# Patient Record
Sex: FEMALE
[De-identification: names, ages, dates, MRNs, and addresses within clinical notes are randomized per-mention and may not be internally consistent; named-entity substitution may affect disease eponyms.]

---

## 2018-02-14 ENCOUNTER — HOSPITAL ENCOUNTER (EMERGENCY)
Dept: HOSPITAL 14 - H.ER | Age: 73
LOS: 1 days | Discharge: HOME | End: 2018-02-15
Payer: MEDICARE

## 2018-02-14 VITALS — OXYGEN SATURATION: 97 %

## 2018-02-14 DIAGNOSIS — R07.9: Primary | ICD-10-CM

## 2018-02-14 DIAGNOSIS — R10.13: ICD-10-CM

## 2018-02-14 LAB
ALBUMIN SERPL-MCNC: 4.1 G/DL (ref 3.5–5)
ALBUMIN/GLOB SERPL: 1 {RATIO} (ref 1–2.1)
ALT SERPL-CCNC: 26 U/L (ref 9–52)
AST SERPL-CCNC: 22 U/L (ref 14–36)
BASOPHILS # BLD AUTO: 0 K/UL (ref 0–0.2)
BASOPHILS NFR BLD: 0.5 % (ref 0–2)
BUN SERPL-MCNC: 23 MG/DL (ref 7–17)
CALCIUM SERPL-MCNC: 11.2 MG/DL (ref 8.4–10.2)
EOSINOPHIL # BLD AUTO: 0.1 K/UL (ref 0–0.7)
EOSINOPHIL NFR BLD: 1.1 % (ref 0–4)
ERYTHROCYTE [DISTWIDTH] IN BLOOD BY AUTOMATED COUNT: 15.6 % (ref 11.5–14.5)
GFR NON-AFRICAN AMERICAN: > 60
HGB BLD-MCNC: 12.8 G/DL (ref 12–16)
LYMPHOCYTES # BLD AUTO: 2.5 K/UL (ref 1–4.3)
LYMPHOCYTES NFR BLD AUTO: 33.4 % (ref 20–40)
MCH RBC QN AUTO: 28.8 PG (ref 27–31)
MCHC RBC AUTO-ENTMCNC: 31.9 G/DL (ref 33–37)
MCV RBC AUTO: 90.3 FL (ref 81–99)
MONOCYTES # BLD: 0.4 K/UL (ref 0–0.8)
MONOCYTES NFR BLD: 5.7 % (ref 0–10)
NEUTROPHILS # BLD: 4.4 K/UL (ref 1.8–7)
NEUTROPHILS NFR BLD AUTO: 59.3 % (ref 50–75)
NRBC BLD AUTO-RTO: 0.3 % (ref 0–0)
PLATELET # BLD: 328 K/UL (ref 130–400)
PMV BLD AUTO: 7.7 FL (ref 7.2–11.7)
RBC # BLD AUTO: 4.43 MIL/UL (ref 3.8–5.2)
WBC # BLD AUTO: 7.4 K/UL (ref 4.8–10.8)

## 2018-02-14 NOTE — ED PDOC
HPI: Chest Pain


Time Seen by Provider: 02/14/18 20:00


Chief Complaint (Nursing): Chest Pain


Chief Complaint (Provider): whole body and chest pain


History Per: Patient


History/Exam Limitations: no limitations


Onset/Duration Of Symptoms: Days (2 days ago)


Current Symptoms Are (Timing): Still Present


Additional Complaint(s): 





72 y.o female, brought in by EMS,  with a history of Diabetes and Hypertension, 

presents to the ED complaining of chest pain, generalized body pain, and 

headache, onset of 2 days ago. Patient also reports of having high blood 

pressure and a subjective fever over the past 2 days, but denies nausea, 

vomiting, or diarrhea. 





PCP: Fabien Junior





Past Medical History


Reviewed: Historical Data, Nursing Documentation, Vital Signs


Vital Signs: 


 Last Vital Signs











Temp  97.9 F   02/15/18 11:51


 


Pulse  85   02/15/18 12:30


 


Resp  19   02/15/18 12:30


 


BP  132/74   02/15/18 12:30


 


Pulse Ox  97   02/15/18 13:00














- Medical History


PMH: Diabetes, HTN, Hypercholesterolemia, Osteoporosis





- Surgical History


Surgical History: Appendectomy, CABG, Pacemaker





- Family History


Family History: States: Diabetes





- Social History


Current smoker - smoking cessation education provided: No


Ex-Smoker (has not smoked in the last 12 months): No


Alcohol: None





- Home Medications


Home Medications: 


 Ambulatory Orders











 Medication  Instructions  Recorded


 


Alendronate [Fosamax] 70 mg PO SUN 02/15/18


 


Carvedilol [Coreg] 25 mg PO Q12H 02/15/18


 


Cyclobenzaprine HCl 5 mg PO HS 02/15/18





[Cyclobenzaprine HCl]  


 


Famotidine [Pepcid] 20 mg PO BID #20 tab 02/15/18


 


GlipiZIDE [Glucotrol] 10 mg PO BID 02/15/18


 


MetFORMIN [glucoPHAGE] 1,000 mg PO BID 02/15/18


 


Olmesartan/Amlodipin/Hcthiazid 1 tab PO DAILY 02/15/18





[Olmsrtn-Amldpn-Hctz 40-10-25Mg]  


 


Pantoprazole Sodium [Protonix] 40 mg PO DAILY 02/15/18


 


Rivaroxaban [Xarelto] 20 mg PO DAILY 02/15/18


 


Vitamin B Complex [Super B-50 1 cap PO DAILY 02/15/18





Complex]  


 


hydrALAZINE [Apresoline] 10 mg PO Q8H 02/15/18














- Allergies


Allergies/Adverse Reactions: 


 Allergies











Allergy/AdvReac Type Severity Reaction Status Date / Time


 


nut - unspecified Allergy  SWELLING Verified 02/14/18 19:35


 


fruits Allergy  SWELLING Uncoded 02/14/18 19:35














Review of Systems


ROS Statement: Except As Marked, All Systems Reviewed And Found Negative


Constitutional: Positive for: Fever, Other (high blood pressure)


Cardiovascular: Positive for: Chest Pain


Gastrointestinal: Negative for: Nausea, Vomiting, Diarrhea


Musculoskeletal: Positive for: Other (generalized body pain)


Neurological: Positive for: Headache





Physical Exam





- Reviewed


Nursing Documentation Reviewed: Yes


Vital Signs Reviewed: Yes





- Physical Exam


Appears: Positive for: Well, Non-toxic, No Acute Distress


Head Exam: Positive for: ATRAUMATIC, NORMAL INSPECTION, NORMOCEPHALIC


Skin: Positive for: Normal Color, Warm, DRY


Eye Exam: Positive for: EOMI, Normal appearance, PERRL


ENT: Positive for: Normal ENT Inspection


Neck: Positive for: Normal, Painless ROM


Cardiovascular/Chest: Positive for: Regular Rate, Rhythm.  Negative for: Murmur


Respiratory: Positive for: Normal Breath Sounds.  Negative for: Respiratory 

Distress


Gastrointestinal/Abdominal: Positive for: Normal Exam, Bowel Sounds, Soft


Back: Positive for: Normal Inspection


Extremity: Positive for: Normal ROM.  Negative for: Pedal Edema, Deformity


Neurologic/Psych: Positive for: Alert, Oriented.  Negative for: Motor/Sensory 

Deficits





- Laboratory Results


Result Diagrams: 


 02/14/18 21:35





 02/14/18 21:35





- ECG


ECG: Positive for: Interpreted By Me, Viewed By Me


ECG Rhythm: Positive for: Normal QRS, Normal ST Segment, Sinus Rhythm (normal)


O2 Sat by Pulse Oximetry: 97 (RA)


Pulse Ox Interpretation: Normal





Medical Decision Making


Medical Decision Making: 





Time:


--22:23


Impression: 


--body aches rule out flu


Plan:


--Chest X-ray





Reassess


--23:46


Patient was reevaluated by the provider, and on reevaluation patient reports of 

right sided abdominal pain


CT ABD & Pelvis w/o PO or IV contrast was ordered


--00:00


Patient to be signed out to Dr. Obinna Mathew pending CT scan. 





Scribe Attestation:


Documented by Jeancarlos Lopez acting as a scribe for Reena Nur MD. 





Provider Attestation:


All medical record entries made by the Scribe were at my direction and 

personally dictated by me. I have reviewed the chart and agree that the record 

accurately reflects my personal performance of the history, physical exam, 

medical decision making, and the department course for this patient. I have 

also personally directed, reviewed, and agree with the discharge instructions 

and disposition.





Disposition





- Clinical Impression


Clinical Impression: 


 Abdominal pain








- Patient ED Disposition


Is Patient to be Admitted: Transfer of Care


Discussed With Dr.: Obinna Mathew


Comment: pending CT scan of ABD & Pelvis


Doctor Will See Patient In The: ED





- Disposition


Referrals: 


Deana Torres MD [Medical Doctor] - 


Domenico Brown MD [Staff Provider] - 


Disposition: Transfer of Care


Disposition Time: 00:00


Condition: STABLE


Prescriptions: 


Famotidine [Pepcid] 20 mg PO BID #20 tab


Instructions:  Acute Abdomen (Belly Pain), Adult (DC)


Forms:  [employer] (Latvian)


Print Language: Belarusian


Patient Signed Over To: Obinna Mathew


Handoff Comments: pending CT and reeval.

## 2018-02-15 VITALS — SYSTOLIC BLOOD PRESSURE: 132 MMHG | DIASTOLIC BLOOD PRESSURE: 74 MMHG | HEART RATE: 85 BPM | RESPIRATION RATE: 19 BRPM

## 2018-02-15 VITALS — TEMPERATURE: 97.9 F

## 2018-02-15 LAB — LIPASE SERPL-CCNC: 72 U/L (ref 23–300)

## 2018-02-15 NOTE — CT
EXAM:

  CT Abdomen and Pelvis Without Intravenous Contrast



CLINICAL HISTORY:

  72 years old, female; Pain; Abdominal pain; Generalized; Prior surgery; 

Surgery date: 6+ months; Surgery type: Appendectomy. Pacemaker cabg; Additional 

info: Abd pain



TECHNIQUE:

  Axial computed tomography images of the abdomen and pelvis without 

intravenous contrast.  All CT scans at this facility use one or more dose 

reduction techniques, viz.: automated exposure control; ma/kV adjustment per 

patient size (including targeted exams where dose is matched to indication; 

i.e. head); or iterative reconstruction technique.  635 images are 

submitted.Limitations: Absence of IV contrast decreases sensitivity for 

detecting vascular and visceral injury and abnormality.  Axial images are 

submitted in soft tissue and lung windows.

  Coronal and sagittal reformatted images were created and reviewed.



COMPARISON:

  CT - ABD PELVIS PO   IV CONTRAST 2015-12-18 08:18



FINDINGS:

  Lower thorax:  Mild parabronchial cuffing, which can be seen with bronchitis, 

reactive airway disease or viral pneumonitis versus mild failure.  There is 

patchy groundglass infiltration of the lung bases.  Correlation with patient's 

hydration status end of failure is recommended.  Cardiomegaly.  There are 

cardiac leads from a cardiac rhythm maintenance device.  Moderate hiatal hernia.



 ABDOMEN:

  Liver:  Unremarkable.

  Gallbladder and bile ducts:  Gallbladder distention with gallbladder wall 

thickening.If clinically warranted, a right upper quadrant ultrasound and 

correlation with LFTs may be helpful for further assessment.  The extrahepatic 

common bile duct measures 10 mm.

  Pancreas:  Unremarkable.  No ductal dilation.

  Spleen:  Unremarkable.  No splenomegaly.

  Adrenals:  There is multilobulated benign adenomatous enlargement of the 

adrenal glands.  There is hypodense right adrenal gland mass measuring 3.0 cm 

with mean Hounsfield unit of 14 unchanged from prior examination from December 18, 2015 representing adrenal adenoma/benign etiology.

  Kidneys and ureters:  Unremarkable.  No obstructing stones.  No 

hydronephrosis.

  Stomach and bowel:  Moderate amount of stool in the colon.  Nonspecific 

colonic wall thickening.  Correlation with patient's clinical history of 

constipation versus stool related colitis versus under distention is 

recommended.

  Appendix:  Appendectomy.



 PELVIS:

  Bladder:  There is nonspecific bladder wall thickening.  This may be related 

to incomplete distention.

  Reproductive:  Hysterectomy.



 ABDOMEN and PELVIS:

  Intraperitoneal space:  Unremarkable.  No free air.  No significant fluid 

collection.

  Bones/joints:  L4-L5 and L5-S1 vacuum degenerative disc disease.  No acute 

fracture.  No dislocation.

  Soft tissues:  Unremarkable.

  Vasculature:  Pelvic phleboliths.  The aorta demonstrates calcified plaque 

and is mildly ectatic but normal in caliber.  No abdominal aortic aneurysm.

  Lymph nodes:  Unremarkable.  No enlarged lymph nodes.



IMPRESSION:     

1.  Gallbladder distention with gallbladder wall thickening.If clinically 

warranted, a right upper quadrant ultrasound and correlation with LFTs may be 

helpful for further assessment.

2.  Mild parabronchial cuffing, which can be seen with bronchitis, reactive 

airway disease or viral pneumonitis versus mild failure.  There is patchy 

groundglass infiltration of the lung bases.  Correlation with patient's 

hydration status end of failure is recommended.

3.  Borderline prominence of common bile duct measuring 10 mm.

## 2018-02-15 NOTE — ED PDOC
- Laboratory Results


Result Diagrams: 


 02/14/18 21:35





 02/14/18 21:35





- ECG


O2 Sat by Pulse Oximetry: 97 (RA)


Pulse Ox Interpretation: Normal





Medical Decision Making


Medical Decision Making: 





Time:


--00:00





Reassess


--Patient signed out to the provider by Dr. Reena Nur pending CT ABD &  

Pelvis. 


--1:45


ABD   PELVIS W/O PO OR IV CONT   


 FINDINGS:  


   Lower thorax:  Mild parabronchial cuffing, which can be seen with bronchitis

,   


 reactive airway disease or viral pneumonitis versus mild failure.  There is   


 patchy groundglass infiltration of the lung bases.  Correlation with patient's

   


 hydration status end of failure is recommended.  Cardiomegaly.  There are   


 cardiac leads from a cardiac rhythm maintenance device.  Moderate hiatal 

hernia.  


 


  ABDOMEN:  


   Liver:  Unremarkable.  


   Gallbladder and bile ducts:  Gallbladder distention with gallbladder wall   


 thickening.If clinically warranted, a right upper quadrant ultrasound and   


 correlation with LFTs may be helpful for further assessment.  The extrahepatic

   


 common bile duct measures 10 mm.  


   Pancreas:  Unremarkable.  No ductal dilation.  


   Spleen:  Unremarkable.  No splenomegaly.  


   Adrenals:  There is multilobulated benign adenomatous enlargement of the   


 adrenal glands.  There is hypodense right adrenal gland mass measuring 3.0 cm 

  


 with mean Hounsfield unit of 14 unchanged from prior examination from December 18, 2015 representing adrenal adenoma/benign etiology.  


   Kidneys and ureters:  Unremarkable.  No obstructing stones.  No   


 hydronephrosis.  


   Stomach and bowel:  Moderate amount of stool in the colon.  Nonspecific   


 colonic wall thickening.  Correlation with patient's clinical history of   


 constipation versus stool related colitis versus under distention is   


 recommended.  


   Appendix:  Appendectomy.  


 


  PELVIS:  


   Bladder:  There is nonspecific bladder wall thickening.  This may be related

   


 to incomplete distention.  


   Reproductive:  Hysterectomy.  


 


  ABDOMEN and PELVIS:  


   Intraperitoneal space:  Unremarkable.  No free air.  No significant fluid   


 collection.  


   Bones/joints:  L4-L5 and L5-S1 vacuum degenerative disc disease.  No acute   


 fracture.  No dislocation.  


   Soft tissues:  Unremarkable.  


   Vasculature:  Pelvic phleboliths.  The aorta demonstrates calcified plaque   


 and is mildly ectatic but normal in caliber.  No abdominal aortic aneurysm.  


   Lymph nodes:  Unremarkable.  No enlarged lymph nodes.  


 


 IMPRESSION:       


 1.  Gallbladder distention with gallbladder wall thickening.If clinically   


 warranted, a right upper quadrant ultrasound and correlation with LFTs may be 

  


 helpful for further assessment.  


 2.  Mild parabronchial cuffing, which can be seen with bronchitis, reactive   


 airway disease or viral pneumonitis versus mild failure.  There is patchy   


 groundglass infiltration of the lung bases.  Correlation with patient's   


 hydration status end of failure is recommended.  


 3.  Borderline prominence of common bile duct measuring 10 mm.  





0500


PT. states she's feeling better, understands she needs to wait for u/s.





0700


Will endorse to day team pending U/S.





Scribe Attestation:


Documented by Jeancarlos Lopez acting as a scribe for Obinna Mathew MD. 





Provider Attestation:


All medical record entries made by the Scribe were at my direction and 

personally dictated by me. I have reviewed the chart and agree that the record 

accurately reflects my personal performance of the history, physical exam, 

medical decision making, and the department course for this patient. I have 

also personally directed, reviewed, and agree with the discharge instructions 

and disposition.





Disposition





- Clinical Impression


Clinical Impression: 


 Chest pain, Abdominal pain








- POA


Present On Arrival: None





- Disposition


Disposition: Transfer of Care


Disposition Time: 07:00


Condition: FAIR


Forms:  "[employer] (English)


Patient Signed Over To: Scarlet Edward


Handoff Comments: pending U/S and re-eval

## 2018-02-15 NOTE — CP.PCM.CON
History of Present Illness





- History of Present Illness


History of Present Illness: 





General Surgery


Dr. Brown





71 y/o F w/ PMHx of HTN and CAD presents to the ED c/o abd pain. Pt states pain 

began yesterday. Pain localized to epigastric/RUQ region. Pt denies having 

similar pain in the past. Pt says pain not associated w/ PO intake. Pt admits 

to concurrent nausea but denies F/C, vomiting, D/C. Pt is currently pain free. 





US ordered by ED shows scant pericholecystic fluid, no GB wall edema, dilated 

CBD and no cholelithiasis.


Surgery consulted for acute cholecystitis. 





PMHx: see above, HLD, pacemaker, DM2, OA


meds: reviewed in chart


ALL: nuts


PSHx: open fibroidectomy, appendectomy, CABG


SHx: denies tobacco, EtOH, drug use


FHx: noncontributory





Review of Systems





- Review of Systems


All systems: reviewed and no additional remarkable complaints except (see HPI)





Past Patient History





- Infectious Disease


Hx of Infectious Diseases: None





- Past Social History


Alcohol: None





- CARDIAC


Hx Hypercholesterolemia: Yes


Hx Hypertension: Yes


Hx Pacemaker: Yes





- PULMONARY


Hx Respiratory Disorders: No





- ENDOCRINE/METABOLIC


Hx Diabetes Mellitus Type 2: Yes





- MUSCULOSKELETAL/RHEUMATOLOGICAL


Hx Osteoporosis: Yes





- GASTROINTESTINAL


Other/Comment: Inguinal hernia





- PSYCHIATRIC


Hx Substance Use: No





- SURGICAL HISTORY


Hx Appendectomy: Yes


Hx Coronary Artery Bypass Graft: Yes





- ANESTHESIA


Hx Anesthesia: Yes


Hx Anesthesia Reactions: No





Meds


Allergies/Adverse Reactions: 


 Allergies











Allergy/AdvReac Type Severity Reaction Status Date / Time


 


nut - unspecified Allergy  SWELLING Verified 02/14/18 19:35


 


fruits Allergy  SWELLING Uncoded 02/14/18 19:35














Physical Exam





- Constitutional


Appears: Non-toxic, No Acute Distress





- Head Exam


Head Exam: NORMAL INSPECTION





- Eye Exam


Eye Exam: Normal appearance





- ENT Exam


ENT Exam: Mucous Membranes Moist





- Respiratory Exam


Respiratory Exam: NORMAL BREATHING PATTERN.  absent: Accessory Muscle Use, 

Respiratory Distress





- GI/Abdominal Exam


GI & Abdominal Exam: Soft.  absent: Distended, Guarding, Rebound, Tenderness





- Expanded GI/Abdominal Exam


  ** Expanded


Expanded GI & Abdominal Exam: absent: Rahman's Sign





- Extremities Exam


Extremities exam: Positive for: normal inspection





- Neurological Exam


Neurological exam: Alert, Oriented x3





- Psychiatric Exam


Psychiatric exam: Normal Affect, Normal Mood





- Skin


Skin Exam: Dry, Intact, Normal Color, Warm





Results





- Vital Signs


Recent Vital Signs: 


 Last Vital Signs











Temp  97.9 F   02/15/18 11:51


 


Pulse  59 L  02/15/18 11:51


 


Resp  20   02/15/18 11:51


 


BP  178/86 H  02/15/18 11:51


 


Pulse Ox  97   02/15/18 11:51














- Labs


Result Diagrams: 


 02/14/18 21:35





 02/14/18 21:35


Labs: 


 Laboratory Results - last 24 hr











  02/14/18 02/14/18 02/14/18





  21:35 21:35 21:35


 


WBC  7.4  


 


RBC  4.43  


 


Hgb  12.8  


 


Hct  40.0  


 


MCV  90.3  


 


MCH  28.8  


 


MCHC  31.9 L  


 


RDW  15.6 H  


 


Plt Count  328  


 


MPV  7.7  


 


Neut % (Auto)  59.3  


 


Lymph % (Auto)  33.4  


 


Mono % (Auto)  5.7  


 


Eos % (Auto)  1.1  


 


Baso % (Auto)  0.5  


 


Neut # (Auto)  4.4  


 


Lymph # (Auto)  2.5  


 


Mono # (Auto)  0.4  


 


Eos # (Auto)  0.1  


 


Baso # (Auto)  0.0  


 


Sodium   140 


 


Potassium   3.7 


 


Chloride   102 


 


Carbon Dioxide   23 


 


Anion Gap   19 


 


BUN   23 H 


 


Creatinine   0.9 


 


Est GFR ( Amer)   > 60 


 


Est GFR (Non-Af Amer)   > 60 


 


Random Glucose   96 


 


Calcium   11.2 H 


 


Total Bilirubin   0.5 


 


AST   22 


 


ALT   26 


 


Alkaline Phosphatase   80 


 


Troponin I   < 0.0120 


 


Total Protein   8.1 


 


Albumin   4.1 


 


Globulin   4.0 H 


 


Albumin/Globulin Ratio   1.0 


 


Lipase   


 


Influenza Typ A,B (EIA)    Negative for flu a/b














  02/15/18





  11:33


 


WBC 


 


RBC 


 


Hgb 


 


Hct 


 


MCV 


 


MCH 


 


MCHC 


 


RDW 


 


Plt Count 


 


MPV 


 


Neut % (Auto) 


 


Lymph % (Auto) 


 


Mono % (Auto) 


 


Eos % (Auto) 


 


Baso % (Auto) 


 


Neut # (Auto) 


 


Lymph # (Auto) 


 


Mono # (Auto) 


 


Eos # (Auto) 


 


Baso # (Auto) 


 


Sodium 


 


Potassium 


 


Chloride 


 


Carbon Dioxide 


 


Anion Gap 


 


BUN 


 


Creatinine 


 


Est GFR ( Amer) 


 


Est GFR (Non-Af Amer) 


 


Random Glucose 


 


Calcium 


 


Total Bilirubin 


 


AST 


 


ALT 


 


Alkaline Phosphatase 


 


Troponin I 


 


Total Protein 


 


Albumin 


 


Globulin 


 


Albumin/Globulin Ratio 


 


Lipase  72


 


Influenza Typ A,B (EIA) 














- Imaging and Cardiology


  ** CT scan - abdomen


Status: Image reviewed by me, Report reviewed by me





  ** US - abdomen


Status: Image reviewed by me, Report reviewed by me





Assessment & Plan





- Assessment and Plan (Free Text)


Assessment: 





71 y/o F w/ abd pain





- no leukocytosis, nontender, afebrile


- LFTs and Lipase WNL


- pain unlikely 2/2 acute cholecystitis


- dilated CBD --> recommend GI consult, pt may need MRCP


- pain management


- CLD


- conservative medical management





Pt discussed w/ Dr. Kevin Palm DO PGY2

## 2018-02-15 NOTE — US
EXAM:

  US Abdomen Limited, Right Upper Quadrant



CLINICAL HISTORY:

  72 years old, female; Pain; Other: Ruq; Additional info: R/O cholecystitis



TECHNIQUE:

  Real-time ultrasound of the right upper quadrant with image documentation.  

Ultrasound technologist Lesvia was contacted.  Pericholecystic edema was 

confirmed.



COMPARISON:

  CT - ABD   PELVIS W/O PO OR IV CONT 2018-02-15 00:45



FINDINGS:

  Artifacts:  Limited due to bowel gas shadowing.  Limited due to shadowing 

from the ribs.

  Liver:  There is hepatic pedal flow in the portal vein.  Enlarged liver 

measuring 18.5 cm.  Limited evaluation of the liver due to shadowing.

  Gallbladder:  Partially distended gallbladder with 3 mm gallbladder wall.  

There is pericholecystic fluid and edema seen on image 17 series 1.  No 

gallstones are identified.

  Common bile duct:  Dilated common bile duct measures 11 mm.  No common duct 

stones.

  Pancreas:  The pancreas is not well-seen.

  Right kidney:  The right kidney measures 10.7 x 5.0 x 4.4 cm.  There is tiny 

right renal cyst measuring 0.9 x 0.6 x 0.8 cm.  No stones.  No hydronephrosis.

  Aorta:  The mid abdominal aorta measures 1.6 cm.  

  Inferior vena cava:  IVC seen.

  Other findings:  There was no right upper quadrant tenderness during the 

sonographic examination.  Correlation with patient's pain medication status is 

recommended.  



Adrenal glands: Right adrenal nodule measuring 2.9 x 2.4 x 2.5 cm. this was 

seen on the CT.  This low density adrenal mass on the CT likely represents 

adrenal adenoma.



IMPRESSION:     

1.  Partially distended gallbladder with 3 mm gallbladder wall.  There is 

pericholecystic fluid and edema seen on image 17 series 1.  No gallstones are 

identified.  Correlation with clinical data is recommended if acalculus 

cholecystitis is clinically suspected.

2.  Dilated common bile duct measures 11 mm.  No common duct stones.

3.  There was no right upper quadrant tenderness during the sonographic 

examination.  Correlation with patient's pain medication status is recommended.

Correlation with internal medicine evaluation and further workup or followup as 

recommended by patient's clinical data.

## 2018-02-15 NOTE — ED PDOC
- Laboratory Results


Result Diagrams: 


 18 21:35





 18 21:35





- ECG


O2 Sat by Pulse Oximetry: 97 (RA)





- Physician Consult Information


Time Consulting Physican Contacted: 09:04


Physician Contacted: Domenico Brown


Outcome Of Conversation: 





Case discussed, notify Surgical resident.  Case discussed with Surgical 

resident. 





Medical Decision Making


Medical Decision Makin:00


--Patient was transferred to me by Dr. Mathew, pending ultrasound and 

reevaluation.





08:05


Gallbladder US


FINDINGS:  


   Artifacts:  Limited due to bowel gas shadowing.  Limited due to shadowing   


 from the ribs.  


   Liver:  There is hepatic pedal flow in the portal vein.  Enlarged liver   


 measuring 18.5 cm.  Limited evaluation of the liver due to shadowing.  


   Gallbladder:  Partially distended gallbladder with 3 mm gallbladder wall.    


 There is pericholecystic fluid and edema seen on image 17 series 1.  No   


 gallstones are identified.  


   Common bile duct:  Dilated common bile duct measures 11 mm.  No common duct 

  


 stones.  


   Pancreas:  The pancreas is not well-seen.  


   Right kidney:  The right kidney measures 10.7 x 5.0 x 4.4 cm.  There is tiny

   


 right renal cyst measuring 0.9 x 0.6 x 0.8 cm.  No stones.  No hydronephrosis.

  


   Aorta:  The mid abdominal aorta measures 1.6 cm.    


   Inferior vena cava:  IVC seen.  


   Other findings:  There was no right upper quadrant tenderness during the   


 sonographic examination.  Correlation with patient's pain medication status is

   


 recommended.    


 


 Adrenal glands: Right adrenal nodule measuring 2.9 x 2.4 x 2.5 cm. this was   


 seen on the CT.  This low density adrenal mass on the CT likely represents   


 adrenal adenoma.  


 


 IMPRESSION:       


 1.  Partially distended gallbladder with 3 mm gallbladder wall.  There is   


 pericholecystic fluid and edema seen on image 17 series 1.  No gallstones are 

  


 identified.  Correlation with clinical data is recommended if acalculus   


 cholecystitis is clinically suspected.  


 2.  Dilated common bile duct measures 11 mm.  No common duct stones.  


 3.  There was no right upper quadrant tenderness during the sonographic   


 examination.  Correlation with patient's pain medication status is 

recommended.  


 Correlation with internal medicine evaluation and further workup or followup 

as   


 recommended by patient's clinical data. 





10:00


Pt comfortable, no complaints, abdominal exam nontender.





11:05


Case discussed with Dr. Griffin (GI), states more of surgical issue, since LFTs 

and T bili WNL, can follow as outpatient. 








12:45


--Surgical resident states patient can follow up with Dr. Brown as an 

outpatient. 





13:00


Patient was discharged. Findings and plan discussed with patient using Rita Cervantes as . 





Disposition





- Clinical Impression


Clinical Impression: 


 Abdominal pain








- POA


Present On Arrival: None





- Disposition


Referrals: 


Deana Torres MD [Medical Doctor] - 


Domenico Brown MD [Staff Provider] - 


Disposition: Routine/Home


Disposition Time: 12:46


Condition: STABLE


Prescriptions: 


Famotidine [Pepcid] 20 mg PO BID #20 tab


Instructions:  Acute Abdomen (Belly Pain), Adult (DC)


Forms:  [employer] (Libyan)


Print Language: Wallisian
